# Patient Record
Sex: FEMALE | Race: WHITE | Employment: FULL TIME | URBAN - METROPOLITAN AREA
[De-identification: names, ages, dates, MRNs, and addresses within clinical notes are randomized per-mention and may not be internally consistent; named-entity substitution may affect disease eponyms.]

---

## 2021-08-03 PROBLEM — Z34.80 NORMAL PREGNANCY IN MULTIGRAVIDA: Status: ACTIVE | Noted: 2021-08-03

## 2021-09-15 PROBLEM — R82.71 GBS BACTERIURIA: Status: ACTIVE | Noted: 2021-09-15

## 2021-11-11 PROBLEM — Z23 NEEDS FLU SHOT: Status: ACTIVE | Noted: 2021-11-11

## 2022-01-26 PROBLEM — Z23 COVID-19 VACCINE ADMINISTERED: Status: ACTIVE | Noted: 2022-01-26

## 2022-01-26 PROBLEM — Z23 NEED FOR DIPHTHERIA-TETANUS-PERTUSSIS (TDAP) VACCINE: Status: ACTIVE | Noted: 2022-01-26

## 2022-02-21 PROBLEM — Z3A.40 40 WEEKS GESTATION OF PREGNANCY: Status: ACTIVE | Noted: 2022-02-21

## 2022-02-22 PROBLEM — Z34.80 NORMAL PREGNANCY IN MULTIGRAVIDA: Status: RESOLVED | Noted: 2021-08-03 | Resolved: 2022-02-22

## 2022-02-22 PROBLEM — Z23 NEED FOR DIPHTHERIA-TETANUS-PERTUSSIS (TDAP) VACCINE: Status: RESOLVED | Noted: 2022-01-26 | Resolved: 2022-02-22

## 2022-02-22 PROBLEM — R82.71 GBS BACTERIURIA: Status: RESOLVED | Noted: 2021-09-15 | Resolved: 2022-02-22

## 2022-02-22 PROBLEM — Z23 NEEDS FLU SHOT: Status: RESOLVED | Noted: 2021-11-11 | Resolved: 2022-02-22

## 2023-04-17 PROBLEM — Z34.90 NORMAL PREGNANCY, ANTEPARTUM: Status: ACTIVE | Noted: 2023-04-17

## 2023-04-19 PROBLEM — E55.9 VITAMIN D DEFICIENCY: Status: ACTIVE | Noted: 2023-04-19

## 2023-07-10 ENCOUNTER — ROUTINE PRENATAL (OUTPATIENT)
Dept: OBGYN CLINIC | Age: 28
End: 2023-07-10

## 2023-07-10 VITALS — SYSTOLIC BLOOD PRESSURE: 114 MMHG | DIASTOLIC BLOOD PRESSURE: 70 MMHG | BODY MASS INDEX: 28.46 KG/M2 | WEIGHT: 171 LBS

## 2023-07-10 DIAGNOSIS — Z34.90 NORMAL PREGNANCY, ANTEPARTUM: ICD-10-CM

## 2023-07-10 DIAGNOSIS — Z3A.24 24 WEEKS GESTATION OF PREGNANCY: Primary | ICD-10-CM

## 2023-07-10 PROCEDURE — 0502F SUBSEQUENT PRENATAL CARE: CPT | Performed by: ADVANCED PRACTICE MIDWIFE

## 2023-07-10 NOTE — PROGRESS NOTES
Pt is here today at her 23w6 prenatal visit  Pt states fetal movement is present  Pt has no concerns

## 2023-07-10 NOTE — PROGRESS NOTES
SUBJECTIVE:  Lucien Virk is here for her return OB visit. She reports fetal movement. She denies vaginal bleeding. She denies vaginal discharge. She denies leaking of fluid. She denies uterine contraction activity. She denies nausea and/or vomiting. She denies retaining fluid in her extremities. Questions about ultrasound at [facility]. OBJECTIVE:  Blood pressure 114/70, weight 171 lb (77.6 kg), last menstrual period 01/24/2023, currently breastfeeding. Lucien Virk has not the flu vaccine as appropriate  Lucien Virk has not the Tdap vaccine as appropriate        ASSESSMENT/PLAN:  1. 24 weeks gestation of pregnancy      2. Normal pregnancy, antepartum        The problem list was reviewed and updated with any new issues from today's visit    Lucien Virk will monitor fetal movement daily. 28 week lab panel was discussed and was not ordered. RhoGam was discussed and was not ordered. Lucien Virk was counseled regarding all of the above    The patient, Leandra Toscano,  was seen with a total time spent of 20 minutes for the visit on this date of service by the [facility]  The time component, involved both face-to-face (counseling and education)  and non face-to-face time (care coordination), spent in determining the total time component.

## 2023-08-10 ENCOUNTER — HOSPITAL ENCOUNTER (OUTPATIENT)
Age: 28
Setting detail: SPECIMEN
Discharge: HOME | End: 2023-08-10

## 2023-08-10 ENCOUNTER — ROUTINE PRENATAL (OUTPATIENT)
Dept: OBGYN CLINIC | Age: 28
End: 2023-08-10
Payer: COMMERCIAL

## 2023-08-10 VITALS
SYSTOLIC BLOOD PRESSURE: 118 MMHG | BODY MASS INDEX: 29.45 KG/M2 | WEIGHT: 177 LBS | DIASTOLIC BLOOD PRESSURE: 80 MMHG | HEART RATE: 94 BPM

## 2023-08-10 DIAGNOSIS — Z3A.28 28 WEEKS GESTATION OF PREGNANCY: ICD-10-CM

## 2023-08-10 DIAGNOSIS — Z23 NEED FOR DIPHTHERIA-TETANUS-PERTUSSIS (TDAP) VACCINE: ICD-10-CM

## 2023-08-10 DIAGNOSIS — Z34.90 NORMAL PREGNANCY, ANTEPARTUM: ICD-10-CM

## 2023-08-10 DIAGNOSIS — Z34.90 NORMAL PREGNANCY, ANTEPARTUM: Primary | ICD-10-CM

## 2023-08-10 LAB
BASOPHILS # BLD: <0.03 K/UL (ref 0–0.2)
BASOPHILS NFR BLD: 0 % (ref 0–2)
EOSINOPHIL # BLD: 0.07 K/UL (ref 0–0.44)
EOSINOPHILS RELATIVE PERCENT: 1 % (ref 1–4)
ERYTHROCYTE [DISTWIDTH] IN BLOOD BY AUTOMATED COUNT: 13.2 % (ref 11.8–14.4)
HCT VFR BLD AUTO: 35.1 % (ref 36.3–47.1)
HGB BLD-MCNC: 10.6 G/DL (ref 11.9–15.1)
IMM GRANULOCYTES # BLD AUTO: 0.1 K/UL (ref 0–0.3)
IMM GRANULOCYTES NFR BLD: 1 %
LYMPHOCYTES NFR BLD: 1.5 K/UL (ref 1.1–3.7)
LYMPHOCYTES RELATIVE PERCENT: 16 % (ref 24–43)
MCH RBC QN AUTO: 27.2 PG (ref 25.2–33.5)
MCHC RBC AUTO-ENTMCNC: 30.2 G/DL (ref 28.4–34.8)
MCV RBC AUTO: 90 FL (ref 82.6–102.9)
MONOCYTES NFR BLD: 0.82 K/UL (ref 0.1–1.2)
MONOCYTES NFR BLD: 9 % (ref 3–12)
NEUTROPHILS NFR BLD: 73 % (ref 36–65)
NEUTS SEG NFR BLD: 6.95 K/UL (ref 1.5–8.1)
NRBC BLD-RTO: 0 PER 100 WBC
PLATELET # BLD AUTO: 244 K/UL (ref 138–453)
PMV BLD AUTO: 10.4 FL (ref 8.1–13.5)
RBC # BLD AUTO: 3.9 M/UL (ref 3.95–5.11)
WBC OTHER # BLD: 9.5 K/UL (ref 3.5–11.3)

## 2023-08-10 PROCEDURE — 90715 TDAP VACCINE 7 YRS/> IM: CPT | Performed by: ADVANCED PRACTICE MIDWIFE

## 2023-08-10 PROCEDURE — 90471 IMMUNIZATION ADMIN: CPT | Performed by: ADVANCED PRACTICE MIDWIFE

## 2023-08-10 PROCEDURE — 0502F SUBSEQUENT PRENATAL CARE: CPT | Performed by: ADVANCED PRACTICE MIDWIFE

## 2023-08-10 NOTE — PROGRESS NOTES
SUBJECTIVE:  Kodak Shelton is here for her return OB visit. She reports fetal movement. She denies vaginal bleeding. She denies vaginal discharge. She denies leaking of fluid. She denies uterine contraction activity. She denies nausea and/or vomiting. She denies retaining fluid in her extremities. Kodak Shelton reports she is doing well. Glucola and Tdap today. Has questions about MFM scan, does not want to go back citing it takes a long time, reviewed recommendation. OBJECTIVE:  Blood pressure 118/80, pulse 94, weight 177 lb (80.3 kg), last menstrual period 2023, currently breastfeeding. Pregravid BMI: 26.63   TW lb (7.711 kg)    Kodak Shelton has not the Tdap vaccine as appropriate, received today     Postpartum Depression: Low Risk     Last EPDS Total Score: 0    Last EPDS Self Harm Result: Never        ASSESSMENT/PLAN:  IUP @ 28w2d  Kodak Shelton will monitor fetal movement daily. 28 week lab panel was discussed and was ordered. Kodak Shelton accepted repeat HIV and T. Pallidum testing. Glucola testing was initiated during this visit. RhoGam was not discussed. N/A  Did not give 24-28 week packet, provide NOV  S = D  Normal pregnancy, antepartum  -     Glucose tolerance, 1 hour; Future  -     HIV Screen; Future- verbal consent   -     T. pallidum Ab; Future  -     CBC with Auto Differential; Future  -     Tetanus-Diphth-Acell Pertussis (BOOSTRIX) injection 0.5 mL  -     Vitamin D 25 Hydroxy; Future   Need for diphtheria-tetanus-pertussis (Tdap) vaccine  Tetanus-Diphth-Acell Pertussis (BOOSTRIX) injection 0.5 mL  Vaccine Information Statement TDAP edition 2/24/15 given to patient on 8/10/2023         Return in about 2 weeks (around 2023) for OB visit with Midwives.      The patient, Angeli Phan, was seen with a total time spent of 28 minutes for the visit on this date of service by the [facility]  The time component involved both face-to-face (counseling and education) and non face-to-face time (care coordination), spent

## 2023-08-10 NOTE — PROGRESS NOTES
Pt is here today at her 28w2 prenatal visit  Pt states fetal movement is present  Pt has no concerns, pt would like tdap    Pt given 50g Glucola  Draw time 9:25a    After obtaining consent, and per orders of  Lyssa Orr CNM injection of Tdap given in Right deltoid by Brian Mccray MA.  Patient instructed to remain in clinic for 20 minutes afterwards, and to report any adverse reaction to me immediately    [address]  EXP 12/6/25

## 2023-08-11 LAB
25(OH)D3 SERPL-MCNC: 43 NG/ML
GLUCOSE 1H P 50 G GLC PO SERPL-MCNC: 108 MG/DL (ref 70–135)
GLUCOSE ADMINISTRATION: NORMAL
HIV 1+2 AB+HIV1 P24 AG SERPL QL IA: NONREACTIVE
T PALLIDUM AB SER QL IA: NONREACTIVE

## 2023-08-30 ENCOUNTER — ROUTINE PRENATAL (OUTPATIENT)
Dept: OBGYN CLINIC | Age: 28
End: 2023-08-30

## 2023-08-30 VITALS — SYSTOLIC BLOOD PRESSURE: 118 MMHG | BODY MASS INDEX: 30.12 KG/M2 | WEIGHT: 181 LBS | DIASTOLIC BLOOD PRESSURE: 74 MMHG

## 2023-08-30 DIAGNOSIS — E55.9 VITAMIN D DEFICIENCY: ICD-10-CM

## 2023-08-30 DIAGNOSIS — Z3A.31 31 WEEKS GESTATION OF PREGNANCY: Primary | ICD-10-CM

## 2023-08-30 PROCEDURE — 0502F SUBSEQUENT PRENATAL CARE: CPT

## 2023-08-30 NOTE — PROGRESS NOTES
SUBJECTIVE:  Isabella Shahid is here for her return OB visit. She reports fetal movement. She denies vaginal bleeding. She denies vaginal discharge. She denies leaking of fluid. She denies uterine contraction activity. She denies nausea and/or vomiting. She denies retaining fluid in her extremities. She denies headache, vision changes, RUQ pain, and epigastric pain. Isabella Shahid reports feeling well overall. No questions or complaints at this time. OBJECTIVE:  Blood pressure 118/74, weight 181 lb (82.1 kg), last menstrual period 2023, currently breastfeeding. Pregravid BMI: 26.63   TW lb (9.526 kg)    Isabella Shahid has received the Tdap vaccine as appropriate  Rhogam was not indicated    ASSESSMENT/PLAN:  IUP @ 31w1d  Isabella Shahid will monitor fetal movement daily. [x] 28 week lab results were reviewed. Glucola 108, Hgb 10.6. [x] Fetal Kick Count was discussed and explained. She was instructed to lay on her left side 20 minutes after eating and count movements for up to 2 hours with a target value of 10 movements. Instructed to notify office if she does not make the target. [x] Warne-Zavala contractions vs  labor contractions were reviewed. [x] Signs and symptoms of Pre-Eclampsia were reviewed and discussed. [] Initial discussion regarding birth plans was begun.  [] Given 36 week birth packet for review. S = D  Isabella Shahid was seen today for routine prenatal visit.     Diagnoses and all orders for this visit:      Patient Active Problem List    Diagnosis Date Noted    RECEIVED Tdap  08/10/2023    Vitamin D deficiency 2023- 27.8, 2,000IU supplement sent    Recheck @ 28 weeks: 43.0        Normal pregnancy, antepartum 2023     FOB- [facility]    NIPT- low risk  Carrier screen- declined  AFP- declined  hgb electrophoresis- normal    Chart reviewed 23  Dr. Ken Krishna    *repeat [facility] per mfm*       22 M Apg  Wt 7#9 2022            Return in about 4 weeks (around 2023)

## 2023-08-30 NOTE — PROGRESS NOTES
Pt is here today at her 31w1 prenatal visit  Pt states fetal movement is  present  Pt has  No concerns

## 2023-09-21 ENCOUNTER — ROUTINE PRENATAL (OUTPATIENT)
Dept: PERINATAL CARE | Age: 28
End: 2023-09-21
Payer: COMMERCIAL

## 2023-09-21 VITALS
HEIGHT: 65 IN | TEMPERATURE: 97 F | HEART RATE: 93 BPM | RESPIRATION RATE: 16 BRPM | BODY MASS INDEX: 30.71 KG/M2 | SYSTOLIC BLOOD PRESSURE: 124 MMHG | DIASTOLIC BLOOD PRESSURE: 79 MMHG | WEIGHT: 184.3 LBS

## 2023-09-21 DIAGNOSIS — O09.899 SHORT INTERVAL BETWEEN PREGNANCIES COMPLICATING PREGNANCY, ANTEPARTUM: ICD-10-CM

## 2023-09-21 DIAGNOSIS — Z36.4 ULTRASOUND FOR ANTENATAL SCREENING FOR FETAL GROWTH RESTRICTION: ICD-10-CM

## 2023-09-21 DIAGNOSIS — O43.103 PLACENTAL ABNORMALITY IN THIRD TRIMESTER: Primary | ICD-10-CM

## 2023-09-21 DIAGNOSIS — Z3A.34 34 WEEKS GESTATION OF PREGNANCY: ICD-10-CM

## 2023-09-21 DIAGNOSIS — Z13.89 ENCOUNTER FOR ROUTINE SCREENING FOR MALFORMATION USING ULTRASONICS: ICD-10-CM

## 2023-09-21 LAB
ABDOMINAL CIRCUMFERENCE: NORMAL
BIPARIETAL DIAMETER: NORMAL
ESTIMATED FETAL WEIGHT: NORMAL
FEMORAL DIAMETER: NORMAL
HC/AC: NORMAL
HEAD CIRCUMFERENCE: NORMAL

## 2023-09-21 PROCEDURE — 76819 FETAL BIOPHYS PROFIL W/O NST: CPT | Performed by: OBSTETRICS & GYNECOLOGY

## 2023-09-21 PROCEDURE — 76805 OB US >/= 14 WKS SNGL FETUS: CPT | Performed by: OBSTETRICS & GYNECOLOGY

## 2023-09-26 ENCOUNTER — TELEPHONE (OUTPATIENT)
Dept: OBGYN CLINIC | Age: 28
End: 2023-09-26

## 2023-09-26 ENCOUNTER — ROUTINE PRENATAL (OUTPATIENT)
Dept: OBGYN CLINIC | Age: 28
End: 2023-09-26

## 2023-09-26 VITALS
SYSTOLIC BLOOD PRESSURE: 114 MMHG | WEIGHT: 184 LBS | BODY MASS INDEX: 30.62 KG/M2 | DIASTOLIC BLOOD PRESSURE: 70 MMHG | HEART RATE: 102 BPM

## 2023-09-26 DIAGNOSIS — Z34.90 NORMAL PREGNANCY, ANTEPARTUM: Primary | ICD-10-CM

## 2023-09-26 DIAGNOSIS — Z3A.35 35 WEEKS GESTATION OF PREGNANCY: ICD-10-CM

## 2023-09-26 PROCEDURE — 0502F SUBSEQUENT PRENATAL CARE: CPT

## 2023-09-26 NOTE — PROGRESS NOTES
SUBJECTIVE:  Chyna Salinas is here for her routine OB visit. She reports fetal movement. She denies vaginal bleeding. She denies leaking of fluid. She denies vaginal discharge. She denies uterine contraction activity. She denies nausea and/or vomiting. She denies retaining fluid in her extremities  She denies headache, visual changes, epigastric discomfort  Chyna Salinas reports she is feeling well. Has concerns about baby still being breech. OBJECTIVE:   Blood pressure 114/70, pulse (!) 102, weight 184 lb (83.5 kg), last menstrual period 2023, currently breastfeeding. Pregravid BMI: 26.63   TW lb (10.9 kg)    Chyna Salinas has received the Tdap vaccine as appropriate  Chyna Salinas has not received the influenza vaccine as appropriate  Rhogam was not indicated    ASSESSMENT/PLAN:  IUP @ 35w0d  Chyna Salinas will monitor for fetal movements daily  []  GBS culture was not obtained. NOV  [x]  28 week lab results were reviewed. Glucola 108, Hgb 10.6. [x]  Signs of labor to report were  and when to call midwives was discussed  []  Birth preferences were not discussed. []  Has been given 36 week birth packet for review. [x]  Signs and symptoms of Pre-Eclampsia were reviewed and discussed. []  Post-dates testing and protocol were not discussed  []  Chyna Salinas was not counseled regarding Post Partum Depression and help  [x]  She has decided on contraceptive choice. - Ring  [x]  Chart has been reviewed by collaborating physician. S = D  Chyna Salinas was seen today for routine prenatal visit. Diagnoses and all orders for this visit:    Normal pregnancy, antepartum    35 weeks gestation of pregnancy    Breech presentation, single or unspecified fetus  Visit with collaborators being set up to discuss ECV vs. Primary C/S      Return in about 1 week (around 10/3/2023) for Prenatal visit.      Chyna Salinas was counseled regarding all of the above    The patient, Rc Fitzgerald, was seen with a total time spent of 35 minutes for the visit on this date of

## 2023-09-26 NOTE — PROGRESS NOTES
Pt is here today at her 35w prenatal visit  Pt states fetal movement is present  Pt has concerns about baby position  Pt declines flu shot

## 2023-09-26 NOTE — TELEPHONE ENCOUNTER
Midwife office called , patient is 35 weeks and breech. She needs a consult next opening showed October 16 patient needs something sooner. I need permission to squeeze her in with one of you please. She will be 38/39 weeks by 10/16 midwifes stated that's to far. Would give patient a call back once we can figure something out.

## 2023-09-27 NOTE — TELEPHONE ENCOUNTER
Can you schedule patient with me at 10:15 am 9/28/23?     Thanks,    DO Margi Tapia Ob/Gyn   9/27/2023, 12:31 PM

## 2023-09-28 ENCOUNTER — INITIAL CONSULT (OUTPATIENT)
Dept: OBGYN CLINIC | Age: 28
End: 2023-09-28

## 2023-09-28 VITALS
HEART RATE: 96 BPM | SYSTOLIC BLOOD PRESSURE: 126 MMHG | BODY MASS INDEX: 30.65 KG/M2 | WEIGHT: 184.2 LBS | DIASTOLIC BLOOD PRESSURE: 77 MMHG

## 2023-09-28 DIAGNOSIS — Z3A.35 35 WEEKS GESTATION OF PREGNANCY: ICD-10-CM

## 2023-09-28 DIAGNOSIS — O09.93 SUPERVISION OF HIGH RISK PREGNANCY IN THIRD TRIMESTER: ICD-10-CM

## 2023-09-28 NOTE — PROGRESS NOTES
Prenatal Visit    Josseline Gan is a 34 y.o. female [id number] at 35w2d    The patient was seen and evaluated. Reports positive fetal movements. She denies headache, vision changes, RUQ pain, contractions, vaginal bleeding and leakage of fluid. The patient already received the T-Dap Vaccine (27-36 weeks) this pregnancy. The patient declined the influenza vaccine this year. The problem list reflects the active issues addressed during today's visit    Vitals:    BP: 126/77  Weight - Scale: 184 lb 3.2 oz (83.6 kg)  Pulse: 96  Fundal Height (cm): 35 cm  Fetal HR: 142  Movement: Present     28 Week Labs: The patient is RH +, Rhogam not indicated  ABO/Rh   Date Value Ref Range Status   04/17/2023 B POSITIVE  Final       1hr GTT: 108   28 week CBC:   Lab Results   Component Value Date    WBC 9.5 08/10/2023    HGB 10.6 (L) 08/10/2023    HCT 35.1 (L) 08/10/2023    MCV 90.0 08/10/2023     08/10/2023       Assessment & Plan:  Josseline Gan is a 34 y.o. female [id number] at 28w1d   - Discussed signs or symptoms of when to call office   - Discussed fetal movement parameters  - 28 week labs completed   - discussed recommendations for TDAP immunization, patient already received TDAP. - Next MMW appt 10/6/23   - Continue taking Prenatal Vitamin.   - The ACIP recommended pregnant patients be included in phase 1C of vaccine distribution. This decision is supported by [facility] and ACOG. As of February 16, 2021, there have been over 30,000 pregnant patients included in the V-safe post COVID vaccination safety . Most (73%) reports to VAERS among pregnant women involved non-pregnancyspecific adverse events (e.g., local and systemic reactions). Miscarriage was the most frequently reported pregnancy-specific adverse event to VAERS; numbers are within the known background rates based on presumed COVID-19 vaccine doses administered to pregnant women.  No unexpected pregnancy or infant outcomes have been observed

## 2023-10-06 ENCOUNTER — ROUTINE PRENATAL (OUTPATIENT)
Dept: OBGYN CLINIC | Age: 28
End: 2023-10-06

## 2023-10-06 ENCOUNTER — HOSPITAL ENCOUNTER (OUTPATIENT)
Age: 28
Setting detail: SPECIMEN
Discharge: HOME | End: 2023-10-06

## 2023-10-06 VITALS
HEART RATE: 100 BPM | WEIGHT: 184 LBS | SYSTOLIC BLOOD PRESSURE: 122 MMHG | DIASTOLIC BLOOD PRESSURE: 78 MMHG | BODY MASS INDEX: 30.62 KG/M2

## 2023-10-06 DIAGNOSIS — Z3A.36 36 WEEKS GESTATION OF PREGNANCY: ICD-10-CM

## 2023-10-06 DIAGNOSIS — O09.93 SUPERVISION OF HIGH RISK PREGNANCY IN THIRD TRIMESTER: ICD-10-CM

## 2023-10-06 DIAGNOSIS — Z23 NEEDS FLU SHOT: ICD-10-CM

## 2023-10-06 DIAGNOSIS — O09.93 SUPERVISION OF HIGH RISK PREGNANCY IN THIRD TRIMESTER: Primary | ICD-10-CM

## 2023-10-06 NOTE — PROGRESS NOTES
Pt is here today at her 36w3 prenatal visit  Pt states fetal movement is present  Pt has concerns about baby position

## 2023-10-06 NOTE — PROGRESS NOTES
SUBJECTIVE:  Raj Malone is here for her routine OB visit. She reports fetal movement. She denies vaginal bleeding. She denies leaking of fluid. She denies vaginal discharge. She denies uterine contraction activity. She denies nausea and/or vomiting. She denies retaining fluid in her extremities  She denies headache, visual changes, epigastric discomfort  Raj Malone reports she thinks baby is now head down. Using [employer] for breast pump      OBJECTIVE:   Blood pressure 122/78, pulse 100, weight 184 lb (83.5 kg), last menstrual period 2023, currently breastfeeding. Pregravid BMI: 26.63   TW lb (10.9 kg)    SVE:  deferred    Raj Malone has received the Tdap vaccine as appropriate  Raj Malone has not received the influenza vaccine as appropriate, declines   Rhogam was not indicated    BSUS shows cephalic presentation    ASSESSMENT/PLAN:  IUP @ 36w3d  Raj Malone will monitor for fetal movements daily  [x]  GBS culture was obtained. [x]  28 week lab results were reviewed. Glucola 108, Hgb 10.6. [x]  Signs of labor to report were  and when to call midwives was discussed. [x]  Birth preferences were discussed. []  Has been given 36 week birth packet for review. [x]  Signs and symptoms of Pre-Eclampsia were reviewed and discussed. []  Post-dates testing and protocol were not discussed  []  Raj Malone was not counseled regarding Post Partum Depression and help  [x]  She has decided on contraceptive choice. Ring. [x]  Chart has been reviewed by collaborating physician. S = D  Supervision of high risk pregnancy in third trimester  Culture, Strep B Screen, Vaginal/Rectal; Future  RESOLVED - Breech presentation, single or unspecified fetus  Cephalic today, monitor at visits. Will notify Dr. Zachary Garcia and other midwives of cancelled ECV on 10/11/23  Needs flu shot  DECLINES        []  Induction management was discussed and induction scheduled N/A    Return in about 1 week (around 10/13/2023) for OB visit with Midwives.

## 2023-10-08 LAB
MICROORGANISM SPEC CULT: ABNORMAL
SPECIMEN DESCRIPTION: ABNORMAL

## 2023-10-09 PROBLEM — B95.1 POSITIVE GBS TEST: Status: ACTIVE | Noted: 2023-10-09

## 2023-10-11 NOTE — PROGRESS NOTES
SUBJECTIVE:    Brittany Mcqueen is here for her routine OB visit. She is doing well but ready for labor! She is reporting new onset back pain along with BH contractions. She has noticed increased mucus plug with blood-tinge. She is open to sVE  She reports  fetal movement. She denies  vaginal bleeding. She denies  leaking of fluid. She denies  vaginal discharge. She denies  uterine contraction activity. She denies  nausea and/or vomiting. She denies retaining fluid in her extremities  She denies headache, visual changes, epigastric discomfort      OBJECTIVE:   Blood pressure 114/74, pulse (!) 102, weight 185 lb (83.9 kg), last menstrual period 01/24/2023, currently breastfeeding. 4/13/2022     4:14 PM   Postpartum Depression Scale   I have been able to laugh and see the funny side of things As much as I always could   I have looked forward with enjoyment to things As much as I ever did   I have blamed myself unnecessarily when things went wrong No, never   I have been anxious or worried for no good reason No, not at all   I have felt scared or panicky for no good reason No, not at all   I haven't been able to cope lately No, I have been coping as well as ever   I have been so unhappy that I have had difficulty sleeping Not at all   I have felt sad or miserable No, not at all   I have been so unhappy that I have been crying No, never   The thought of harming myself has occurred to me Never   Total 0            10/13/2021     2:50 PM   PHQ Scores   PHQ2 Score 0   PHQ9 Score 0           4/17/2023    11:00 AM   MYRON 7 SCORE   MYRON-7 Total Score 0     SVE: Posterior/softening/1 cm/40%/-2 and Asynclitic      ASSESSMENT/PLAN:  1. 37 weeks gestation of pregnancy  S=D    2. Normal pregnancy, antepartum  The problem list was reviewed and updated as needed.     Brittany Mcqueen will monitor for fetal movements daily    []  3rd trimester labs were not obtained   [x]  Signs of labor to report were discussed  [x]  When to call/page for

## 2023-10-12 ENCOUNTER — ROUTINE PRENATAL (OUTPATIENT)
Dept: OBGYN CLINIC | Age: 28
End: 2023-10-12

## 2023-10-12 VITALS
WEIGHT: 185 LBS | DIASTOLIC BLOOD PRESSURE: 74 MMHG | BODY MASS INDEX: 30.79 KG/M2 | HEART RATE: 102 BPM | SYSTOLIC BLOOD PRESSURE: 114 MMHG

## 2023-10-12 DIAGNOSIS — Z3A.37 37 WEEKS GESTATION OF PREGNANCY: ICD-10-CM

## 2023-10-12 DIAGNOSIS — Z34.90 NORMAL PREGNANCY, ANTEPARTUM: Primary | ICD-10-CM

## 2023-10-12 DIAGNOSIS — B95.1 POSITIVE GBS TEST: ICD-10-CM

## 2023-10-12 NOTE — PROGRESS NOTES
Pt is here today at her 37w2 prenatal visit  Pt states fetal movement is present  Pt has no concerns states back pain and cramping with possible mucous plug discharge.

## 2023-10-19 ENCOUNTER — ROUTINE PRENATAL (OUTPATIENT)
Dept: OBGYN CLINIC | Age: 28
End: 2023-10-19

## 2023-10-19 VITALS
BODY MASS INDEX: 30.62 KG/M2 | WEIGHT: 184 LBS | SYSTOLIC BLOOD PRESSURE: 118 MMHG | DIASTOLIC BLOOD PRESSURE: 72 MMHG | HEART RATE: 103 BPM

## 2023-10-19 DIAGNOSIS — Z3A.38 38 WEEKS GESTATION OF PREGNANCY: ICD-10-CM

## 2023-10-19 DIAGNOSIS — Z34.90 NORMAL PREGNANCY, ANTEPARTUM: Primary | ICD-10-CM

## 2023-10-19 PROCEDURE — 0502F SUBSEQUENT PRENATAL CARE: CPT | Performed by: ADVANCED PRACTICE MIDWIFE

## 2023-10-19 NOTE — PROGRESS NOTES
Pt is here today at her 38w2 prenatal visit  Pt states fetal movement is present  Pt has no concerns
management was discussed and induction scheduled N/A    Return in about 1 week (around 10/26/2023) for OB visit with Midwives. Cruz Camacho was counseled regarding all of the above    The patient, Dejuan Buckner, was seen with a total time spent of 28 minutes for the visit on this date of service by the [facility]  The time component involved both face-to-face (counseling and education) and non face-to-face time (care coordination), spent in determining the total time component.      On this date October 19, 2023, I have spent greater than 50% of this visit:  [x] Reviewing previous notes/pre-charting  [x] Reviewing test results  [x] Performing necessary physical exam/evaluation  [] Ordering medications, tests, procedures  [] Placing referrals or communicating with other HCP  [x] Documenting and updating Problem List as necessary  [x] Importance of compliance with treatment plan discussed  [x] Counseling patient/support person  [] Coordinating care    Electronically signed by: LEENA Saucedo CNM

## 2023-10-25 ENCOUNTER — ROUTINE PRENATAL (OUTPATIENT)
Dept: OBGYN CLINIC | Age: 28
End: 2023-10-25

## 2023-10-25 VITALS
SYSTOLIC BLOOD PRESSURE: 118 MMHG | HEART RATE: 80 BPM | BODY MASS INDEX: 30.79 KG/M2 | DIASTOLIC BLOOD PRESSURE: 80 MMHG | WEIGHT: 185 LBS

## 2023-10-25 DIAGNOSIS — Z34.90 NORMAL PREGNANCY, ANTEPARTUM: ICD-10-CM

## 2023-10-25 DIAGNOSIS — Z3A.39 39 WEEKS GESTATION OF PREGNANCY: Primary | ICD-10-CM

## 2023-10-25 PROCEDURE — 0502F SUBSEQUENT PRENATAL CARE: CPT

## 2023-10-25 NOTE — PROGRESS NOTES
SUBJECTIVE:  Amber Hunter is here for her routine OB visit. She reports fetal movement. She denies vaginal bleeding. She denies leaking of fluid. She denies vaginal discharge. She denies uterine contraction activity. She denies nausea and/or vomiting. She denies retaining fluid in her extremities  She denies headache, visual changes, epigastric discomfort  Amber Hunter reports she is feeling well, no concerns today. OBJECTIVE:   Blood pressure 118/80, pulse 80, weight 83.9 kg (185 lb), last menstrual period 2023, currently breastfeeding. Pregravid BMI: 26.63   TW.3 kg (25 lb)    SVE:  3/60%/-2, soft consistency, posterior position, cephalic   0 1 2 3 Patient    Dilation Closed 1-2 3-4 5-6 2    Effacement 0-30 40-50 60-70 >80 2    Station -3 -2 -1/0 +1/+2 1    Consistency Firm Med Soft  2    Position Post Mid Ant  0   TOTAL        7       Amber Hunter has received the Tdap vaccine as appropriate  Amber Hunter has not received the influenza vaccine as appropriate  Rhogam was not indicated    ASSESSMENT/PLAN:  IUP @ 39w1d  Amber Hunter will monitor for fetal movements daily  [x]  GBS POSITIVE - PCN in labor  [x]  28 week lab results were reviewed. Glucola 108, Hgb 10.6. [x]  Signs of labor to report were  and when to call midwives was discussed  [x]  Birth preferences were discussed. [x]  Signs and symptoms of Pre-Eclampsia were reviewed and discussed. []  Post-dates testing and protocol were not discussed  []  Amber Hunter was not counseled regarding Post Partum Depression and help  []  She has not decided on contraceptive choice. []  Chart has been reviewed by collaborating physician. S = D  Amber Hunter was seen today for routine prenatal visit. Diagnoses and all orders for this visit:    Normal pregnancy, antepartum    39 weeks gestation of pregnancy    Return in about 1 week (around 2023) for Prenatal visit.      Amber Hunter was counseled regarding all of the above    The patient, Juve Rojo, was seen with a total time

## 2023-11-01 ENCOUNTER — HOSPITAL ENCOUNTER (INPATIENT)
Age: 28
LOS: 2 days | Discharge: HOME | End: 2023-11-03
Attending: STUDENT IN AN ORGANIZED HEALTH CARE EDUCATION/TRAINING PROGRAM | Admitting: STUDENT IN AN ORGANIZED HEALTH CARE EDUCATION/TRAINING PROGRAM
Payer: COMMERCIAL

## 2023-11-01 PROBLEM — O47.9 UTERINE CONTRACTIONS DURING PREGNANCY: Status: ACTIVE | Noted: 2023-11-01

## 2023-11-01 LAB
ABO + RH BLD: NORMAL
AMPHET UR QL SCN: NEGATIVE
ARM BAND NUMBER: NORMAL
BARBITURATES UR QL SCN: NEGATIVE
BENZODIAZ UR QL: NEGATIVE
BLOOD BANK SAMPLE EXPIRATION: NORMAL
BLOOD GROUP ANTIBODIES SERPL: NEGATIVE
CANNABINOIDS UR QL SCN: NEGATIVE
COCAINE UR QL SCN: NEGATIVE
ERYTHROCYTE [DISTWIDTH] IN BLOOD BY AUTOMATED COUNT: 14.6 % (ref 11.8–14.4)
FENTANYL UR QL: NEGATIVE
HCT VFR BLD AUTO: 32.7 % (ref 36.3–47.1)
HGB BLD-MCNC: 10.3 G/DL (ref 11.9–15.1)
MCH RBC QN AUTO: 23.6 PG (ref 25.2–33.5)
MCHC RBC AUTO-ENTMCNC: 31.5 G/DL (ref 28.4–34.8)
MCV RBC AUTO: 75 FL (ref 82.6–102.9)
METHADONE UR QL: NEGATIVE
NRBC BLD-RTO: 0 PER 100 WBC
OPIATES UR QL SCN: NEGATIVE
OXYCODONE UR QL SCN: NEGATIVE
PCP UR QL SCN: NEGATIVE
PLATELET # BLD AUTO: 249 K/UL (ref 138–453)
PMV BLD AUTO: 10.9 FL (ref 8.1–13.5)
RBC # BLD AUTO: 4.36 M/UL (ref 3.95–5.11)
TEST INFORMATION: NORMAL
WBC OTHER # BLD: 9.8 K/UL (ref 3.5–11.3)

## 2023-11-01 PROCEDURE — 86780 TREPONEMA PALLIDUM: CPT

## 2023-11-01 PROCEDURE — 2580000003 HC RX 258: Performed by: ADVANCED PRACTICE MIDWIFE

## 2023-11-01 PROCEDURE — 86901 BLOOD TYPING SEROLOGIC RH(D): CPT

## 2023-11-01 PROCEDURE — 85027 COMPLETE CBC AUTOMATED: CPT

## 2023-11-01 PROCEDURE — 1220000000 HC SEMI PRIVATE OB R&B

## 2023-11-01 PROCEDURE — 6360000002 HC RX W HCPCS: Performed by: ADVANCED PRACTICE MIDWIFE

## 2023-11-01 PROCEDURE — 80307 DRUG TEST PRSMV CHEM ANLYZR: CPT

## 2023-11-01 PROCEDURE — 86900 BLOOD TYPING SEROLOGIC ABO: CPT

## 2023-11-01 PROCEDURE — 86850 RBC ANTIBODY SCREEN: CPT

## 2023-11-01 RX ORDER — SODIUM CHLORIDE 0.9 % (FLUSH) 0.9 %
5-40 SYRINGE (ML) INJECTION PRN
Status: DISCONTINUED | OUTPATIENT
Start: 2023-11-01 | End: 2023-11-02

## 2023-11-01 RX ORDER — SODIUM CHLORIDE, SODIUM LACTATE, POTASSIUM CHLORIDE, AND CALCIUM CHLORIDE .6; .31; .03; .02 G/100ML; G/100ML; G/100ML; G/100ML
1000 INJECTION, SOLUTION INTRAVENOUS PRN
Status: DISCONTINUED | OUTPATIENT
Start: 2023-11-01 | End: 2023-11-02

## 2023-11-01 RX ORDER — SODIUM CHLORIDE 0.9 % (FLUSH) 0.9 %
5-40 SYRINGE (ML) INJECTION EVERY 12 HOURS SCHEDULED
Status: DISCONTINUED | OUTPATIENT
Start: 2023-11-01 | End: 2023-11-02

## 2023-11-01 RX ORDER — SODIUM CHLORIDE 9 MG/ML
INJECTION, SOLUTION INTRAVENOUS PRN
Status: DISCONTINUED | OUTPATIENT
Start: 2023-11-01 | End: 2023-11-02

## 2023-11-01 RX ORDER — SODIUM CHLORIDE, SODIUM LACTATE, POTASSIUM CHLORIDE, CALCIUM CHLORIDE 600; 310; 30; 20 MG/100ML; MG/100ML; MG/100ML; MG/100ML
INJECTION, SOLUTION INTRAVENOUS CONTINUOUS
Status: DISCONTINUED | OUTPATIENT
Start: 2023-11-01 | End: 2023-11-02

## 2023-11-01 RX ORDER — SODIUM CHLORIDE, SODIUM LACTATE, POTASSIUM CHLORIDE, AND CALCIUM CHLORIDE .6; .31; .03; .02 G/100ML; G/100ML; G/100ML; G/100ML
500 INJECTION, SOLUTION INTRAVENOUS PRN
Status: DISCONTINUED | OUTPATIENT
Start: 2023-11-01 | End: 2023-11-02

## 2023-11-01 RX ADMIN — SODIUM CHLORIDE, POTASSIUM CHLORIDE, SODIUM LACTATE AND CALCIUM CHLORIDE: 600; 310; 30; 20 INJECTION, SOLUTION INTRAVENOUS at 22:59

## 2023-11-01 RX ADMIN — SODIUM CHLORIDE 5 MILLION UNITS: 900 INJECTION INTRAVENOUS at 23:01

## 2023-11-02 PROBLEM — Z23 NEEDS FLU SHOT: Status: RESOLVED | Noted: 2023-10-06 | Resolved: 2023-11-02

## 2023-11-02 PROBLEM — O47.9 UTERINE CONTRACTIONS DURING PREGNANCY: Status: RESOLVED | Noted: 2023-11-01 | Resolved: 2023-11-02

## 2023-11-02 PROBLEM — Z34.90 NORMAL PREGNANCY, ANTEPARTUM: Status: RESOLVED | Noted: 2023-04-17 | Resolved: 2023-11-02

## 2023-11-02 PROBLEM — Z23 NEED FOR DIPHTHERIA-TETANUS-PERTUSSIS (TDAP) VACCINE: Status: RESOLVED | Noted: 2023-08-10 | Resolved: 2023-11-02

## 2023-11-02 LAB — T PALLIDUM AB SER QL IA: NONREACTIVE

## 2023-11-02 PROCEDURE — 10907ZC DRAINAGE OF AMNIOTIC FLUID, THERAPEUTIC FROM PRODUCTS OF CONCEPTION, VIA NATURAL OR ARTIFICIAL OPENING: ICD-10-PCS | Performed by: ADVANCED PRACTICE MIDWIFE

## 2023-11-02 PROCEDURE — 1220000000 HC SEMI PRIVATE OB R&B

## 2023-11-02 PROCEDURE — 6370000000 HC RX 637 (ALT 250 FOR IP): Performed by: STUDENT IN AN ORGANIZED HEALTH CARE EDUCATION/TRAINING PROGRAM

## 2023-11-02 PROCEDURE — 7200000001 HC VAGINAL DELIVERY

## 2023-11-02 PROCEDURE — 59400 OBSTETRICAL CARE: CPT | Performed by: ADVANCED PRACTICE MIDWIFE

## 2023-11-02 PROCEDURE — 88307 TISSUE EXAM BY PATHOLOGIST: CPT

## 2023-11-02 PROCEDURE — 6360000002 HC RX W HCPCS: Performed by: ADVANCED PRACTICE MIDWIFE

## 2023-11-02 RX ORDER — SODIUM CHLORIDE 0.9 % (FLUSH) 0.9 %
5-40 SYRINGE (ML) INJECTION PRN
Status: DISCONTINUED | OUTPATIENT
Start: 2023-11-02 | End: 2023-11-03 | Stop reason: HOSPADM

## 2023-11-02 RX ORDER — ONDANSETRON 2 MG/ML
4 INJECTION INTRAMUSCULAR; INTRAVENOUS EVERY 6 HOURS PRN
Status: DISCONTINUED | OUTPATIENT
Start: 2023-11-02 | End: 2023-11-02

## 2023-11-02 RX ORDER — CARBOPROST TROMETHAMINE 250 UG/ML
250 INJECTION, SOLUTION INTRAMUSCULAR PRN
Status: DISCONTINUED | OUTPATIENT
Start: 2023-11-02 | End: 2023-11-02

## 2023-11-02 RX ORDER — LANOLIN 72 %
OINTMENT (GRAM) TOPICAL PRN
Status: DISCONTINUED | OUTPATIENT
Start: 2023-11-02 | End: 2023-11-03 | Stop reason: HOSPADM

## 2023-11-02 RX ORDER — IBUPROFEN 600 MG/1
600 TABLET ORAL EVERY 6 HOURS PRN
Qty: 40 TABLET | Refills: 1 | Status: SHIPPED | OUTPATIENT
Start: 2023-11-02

## 2023-11-02 RX ORDER — SODIUM CHLORIDE 0.9 % (FLUSH) 0.9 %
5-40 SYRINGE (ML) INJECTION EVERY 12 HOURS SCHEDULED
Status: DISCONTINUED | OUTPATIENT
Start: 2023-11-02 | End: 2023-11-03 | Stop reason: HOSPADM

## 2023-11-02 RX ORDER — SIMETHICONE 80 MG
80 TABLET,CHEWABLE ORAL EVERY 6 HOURS PRN
Status: DISCONTINUED | OUTPATIENT
Start: 2023-11-02 | End: 2023-11-03 | Stop reason: HOSPADM

## 2023-11-02 RX ORDER — SODIUM CHLORIDE 9 MG/ML
INJECTION, SOLUTION INTRAVENOUS PRN
Status: DISCONTINUED | OUTPATIENT
Start: 2023-11-02 | End: 2023-11-03 | Stop reason: HOSPADM

## 2023-11-02 RX ORDER — TRANEXAMIC ACID 10 MG/ML
1000 INJECTION, SOLUTION INTRAVENOUS
Status: DISCONTINUED | OUTPATIENT
Start: 2023-11-02 | End: 2023-11-02

## 2023-11-02 RX ORDER — SENNA AND DOCUSATE SODIUM 50; 8.6 MG/1; MG/1
1 TABLET, FILM COATED ORAL 2 TIMES DAILY
Qty: 60 TABLET | Refills: 0 | Status: SHIPPED | OUTPATIENT
Start: 2023-11-02

## 2023-11-02 RX ORDER — DOCUSATE SODIUM 100 MG/1
100 CAPSULE, LIQUID FILLED ORAL 2 TIMES DAILY
Status: DISCONTINUED | OUTPATIENT
Start: 2023-11-02 | End: 2023-11-03 | Stop reason: HOSPADM

## 2023-11-02 RX ORDER — IBUPROFEN 600 MG/1
600 TABLET ORAL EVERY 6 HOURS PRN
Status: DISCONTINUED | OUTPATIENT
Start: 2023-11-02 | End: 2023-11-03 | Stop reason: HOSPADM

## 2023-11-02 RX ORDER — BISACODYL 10 MG
10 SUPPOSITORY, RECTAL RECTAL DAILY PRN
Status: DISCONTINUED | OUTPATIENT
Start: 2023-11-02 | End: 2023-11-03 | Stop reason: HOSPADM

## 2023-11-02 RX ORDER — MISOPROSTOL 100 UG/1
800 TABLET ORAL PRN
Status: DISCONTINUED | OUTPATIENT
Start: 2023-11-02 | End: 2023-11-02

## 2023-11-02 RX ORDER — ONDANSETRON 2 MG/ML
4 INJECTION INTRAMUSCULAR; INTRAVENOUS EVERY 4 HOURS PRN
Status: DISCONTINUED | OUTPATIENT
Start: 2023-11-02 | End: 2023-11-03 | Stop reason: HOSPADM

## 2023-11-02 RX ORDER — ACETAMINOPHEN 500 MG
1000 TABLET ORAL EVERY 6 HOURS PRN
Status: DISCONTINUED | OUTPATIENT
Start: 2023-11-02 | End: 2023-11-03 | Stop reason: HOSPADM

## 2023-11-02 RX ORDER — ACETAMINOPHEN 500 MG
1000 TABLET ORAL EVERY 6 HOURS PRN
Qty: 30 TABLET | Refills: 1 | Status: SHIPPED | OUTPATIENT
Start: 2023-11-02

## 2023-11-02 RX ORDER — METHYLERGONOVINE MALEATE 0.2 MG/ML
200 INJECTION INTRAVENOUS PRN
Status: DISCONTINUED | OUTPATIENT
Start: 2023-11-02 | End: 2023-11-02

## 2023-11-02 RX ADMIN — DOCUSATE SODIUM 100 MG: 100 CAPSULE, LIQUID FILLED ORAL at 19:52

## 2023-11-02 RX ADMIN — Medication 166.7 ML: at 07:48

## 2023-11-02 RX ADMIN — Medication 2.5 MILLION UNITS: at 03:12

## 2023-11-02 RX ADMIN — ACETAMINOPHEN 1000 MG: 500 TABLET ORAL at 09:34

## 2023-11-02 RX ADMIN — Medication 2.5 MILLION UNITS: at 07:07

## 2023-11-02 NOTE — PROGRESS NOTES
OB/GYN MMW Resident Involvement Note     Date: 2023       Time: 10:42 PM   Patient Name: Serena Edwards     Patient : 1995  Room/Bed: 6145/5003-75    Admission Date/Time: 2023 10:21 PM      HPI: Serena Edwards is a 34 y.o. [id number] at 40w1d who presents in spontaneous labor. The patient reports fetal movement is present, complains of contractions, denies loss of fluid, denies vaginal bleeding. Patient denies RUQ pain, nausea, vomitting, vision changes, HA. Patients pregnancy is complicated by short interval pregnancy. DATING:  LMP: Patient's last menstrual period was 2023 (exact date).   Estimated Date of Delivery: 10/31/23   Based on: LMP, consistent with US at 11 6/7 weeks GA    PREGNANCY RISK FACTORS:  Patient Active Problem List   Diagnosis    Normal pregnancy, antepartum    Vitamin D deficiency    RECEIVED Tdap     RESOLVED - Breech presentation    DECLINES flu vaccine    Positive GBS test    Uterine contractions during pregnancy        Steroids Given In This Pregnancy:  no     REVIEW OF SYSTEMS:   Constitutional: negative fever, negative chills, negative weight changes   HEENT: negative visual disturbances, negative headaches, negative dizziness, negative hearing loss  Breast: Negative breast abnormalities, negative breast lumps, negative nipple discharge  Respiratory: negative dyspnea, negative cough, negative SOB  Cardiovascular: negative chest pain,  negative palpitations, negative arrhythmia, negative syncope   Gastrointestinal: negative abdominal pain, negative RUQ pain, negative N/V, negative diarrhea, negative constipation, negative bowel changes, negative heartburn   Genitourinary: negative dysuria, negative hematuria, negative urinary incontinence, negative vaginal discharge, negative vaginal bleeding or spotting  Dermatological: negative rash, negative pruritis, negative mole or other skin changes  Hematologic: negative bruising  Immunologic/Lymphatic: negative

## 2023-11-02 NOTE — CARE COORDINATION
ANTEPARTUM NOTE    Uterine contractions during pregnancy [O47.9]    Thais Mora was admitted to L&D on 11/01/2023 for contractions and labor @ 40 1/7    OB GYN Provider: [facility]    Will meet with patient after delivery to verify name/address/phone/insurance and discuss discharge planning. Anticipate DC home 2 nights after vaginal delivery or 4 nights after C/S delivery as long as hemodynamically stable.

## 2023-11-02 NOTE — L&D DELIVERY NOTE
Galina Part [[id number]]      Labor Events     Labor: No   Steroids: None  Cervical Ripening Date/Time:      Antibiotics Received during Labor: Yes  Rupture Identifier: Sac 1  Rupture Date/Time:  23 03:52:08   Rupture Type: AROM  Fluid Color: Clear  Fluid Odor: None  Fluid Volume:  Moderate  Induction: None  Augmentation: None              Anesthesia    Method: None       Labor Event Times      Labor onset date/time:  23 03:46:00     Dilation complete date/time:        Start pushing date/time:  2023 56:25:03   Decision date/time (emergent ):            Delivery Details      Delivery Date: 23 Delivery Time: 07:35:00   Delivery Type: Vaginal, Spontaneous              Shoulder Dystocia    Shoulder Dystocia Present?: No                                                                                                           Assisted Delivery Details    Forceps Attempted?: No  Vacuum Extractor Attempted?: No                                                             Cord    Vessels: 3 Vessels  Complications: None  Delayed Cord Clamping?: Yes  Cord Clamped Date/Time: 2023 07:44:00  Cord Blood Disposition: Lab  Gases Sent?: Yes              Placenta    Date/Time: 2023 07:48:00  Removal: Spontaneous  Appearance: Intact  Disposition: Discarded       Lacerations    Episiotomy: None  Perineal Lacerations: None  Other Lacerations: no non-perineal laceration       Vaginal Counts    Initial Count Personnel: Sandhya Morrissey       Blood Loss  Mother: Acosta Bianchi #[id number]     Start of Mother's Information      Delivery Blood Loss  23 0346 - 23 0800      None                 End of Mother's Information  Mother: Acosta Bianchi #[id number]                Delivery Providers    Delivering clinician: LEENA Mao CNM     Provider Role     Obstetrician    Cade Dent RN Primary Nurse     Primary Union Nurse     NICU Nurse     Neonatologist

## 2023-11-02 NOTE — FLOWSHEET NOTE
Patient admitted to room 742  from L&D via wheelchair. Oriented to room and surroundings. Plan of care reviewed. Verbalized understanding. Instructed on infant security and safe sleep practices. Preventing falls education provided . The following handouts given: A New Beginning: Your Guide to Postpartum Care, Rounding, gs Security System,Babies Cry A lot, Safe Sleep, Security and Visitation Guidelines. Call light placed within reach.

## 2023-11-02 NOTE — CARE COORDINATION
CASE MANAGEMENT POST-PARTUM TRANSITIONAL CARE PLAN    Uterine contractions during pregnancy [O47.9]    OB Provider: [facility] met w/ Ana Fu at her bedside to discuss DCP. She is S/P  on 2023    Writer verified phone number correct on facesheet. S[place]     [facility] [address]    [place] [place] [address]    Information faxed to [phone] for correction. She states she lives with her  Cheyenne Jones and their two children. Ana Fu verbalized no difficulties with transportation to and from doctors appointments or with paying for medications upon discharge home. [employer] insurance correct. There two listed but they appear to be the same policy. Also sent to [phone] for correction. Writer notified Ana Fu she has  30 days from date of birth to add  to insurance policy. She verbalized understanding. Ana Fu confirmed a safe place for infant to sleep at home. Infant name on BC: Katia Guallpa. Infant PCP Dr George Monsivais. DME: no  HOME CARE: no    Anticipated DC of mother and infant 1-2 days post .      Readmission Risk              Risk of Unplanned Readmission:  4

## 2023-11-02 NOTE — FLOWSHEET NOTE
Pt arrived with complaints of contractions q 3 minutes. Pt denies any leakage of fluid, vaginal bleeding or discharge.  Postive fetal movement

## 2023-11-02 NOTE — PROGRESS NOTES
[address] [facility],[address] LABOR & DELIVERY  [address] [address]  Dept: [phone]  Loc: [phone]  LABOR PROGRESS NOTE      Patient Name: Washington Hwang  Patient : 1995  Room/Bed: 8937/3894-35  Admission Date/Time: 2023 10:21 PM  MRN #: [id number]  [facility] #: [id number]    Date: 2023  Time: 3:55 AM    The patient was seen and examined. Her pain is well controlled. She reports her contractions \"feel about the same\". She is able to talk through them. She reports fetal movement is present, complains of contractions, denies loss of fluid, denies vaginal bleeding. Denies s/s of preeclampsia including headache vision changes, difficulty breathing, chest pain, RUQ pain or worsening swelling of extremities. Vital Signs:  VS:  blood pressure is 138/83 and her pulse is 85. Her respiration is 16 and oxygen saturation is 98%. FHT:    Baseline: 115   Variability: moderate              Accels: present   Decels: Absent         Contraction pattern:                                  Frequency: q3 min                                 Duration:                                  Intensity: moderate                                 Pitocin: 0                                 IUPC:  No  Chaperone: SHIRA Hi RN  Cervix:             Dilation: 6            Effacement: 80            Station: 0            Position: posterior            Consistency: soft    Status of membranes: intact    Pain control: coping well    Maternal status (hydration, fatigue, voids): WNL    Interventions: Pt was offered AROM to augment labor. Risk/ benefits discussed and pt agreeable.      Assessment/Plan:  Washington Hwang is a 34 y.o. female [id number] at 40w2d admitted for inpatient   - cEFM/ TOCO  - GBS positive, treated x 2 doses   - VSS, Afebrile    - IV SL   - expectant management   - anticipate    - ROM x 0 hrs   - Diet: regular   - Category 1 strip      Attending: Dr. Thu Rouse, APRN

## 2023-11-02 NOTE — FLOWSHEET NOTE
Pt off the monitor for intermittant monitoring orders Per Estrella Cárdenas CNM. Pt up to ambulate hallway or use physioball. positive fetal movement

## 2023-11-02 NOTE — H&P
OBSTETRICAL HISTORY AND PHYSICAL      [facility] [address] [facility] LABOR & DELIVERY  [place] [address]  Dept: [phone]  Loc: [phone]  Provider:  LEENA Melchor CNM      Date: 2023  Time: 10:50 PM    Patient Name: Josseline Gan  Patient : 1995  Room/Bed: [facility]  Admission Date/Time: 2023 10:21 PM  MRN #: [id number]  [facility] #: [id number]    Primary Care Physician: No primary care provider on file. Admitting Attending: LEENA Melchor CNM      Josseline Gan is a 34 y.o. female [id number]    CC: Onset of Labor     HPI: Josseline Gan is a 34 y.o. [id number] at 40w1d who presents with contractions since . The patient reports fetal movement is present, complains of contractions, denies loss of fluid, denies vaginal bleeding. This patient is agreeable to resident participation in her care. DATING:  LMP: Patient's last menstrual period was 2023 (exact date). Estimated Date of Delivery: 10/31/23   Based on: LMP    PREGNANCY RISK FACTORS:  Patient Active Problem List   Diagnosis    Normal pregnancy, antepartum    Vitamin D deficiency    RECEIVED Tdap     RESOLVED - Breech presentation    DECLINES flu vaccine    Positive GBS test    Uterine contractions during pregnancy         Allergies: Allergies as of 2023    (No Known Allergies)       Medications:  No current facility-administered medications on file prior to encounter. Current Outpatient Medications on File Prior to Encounter   Medication Sig Dispense Refill    Vitamin D, Cholecalciferol, 50 MCG ( UT) CAPS Take 1 capsule by mouth daily 30 capsule 5    Prenatal Vit-Fe Fumarate-FA (PRENATAL VITAMIN) 27-0.8 MG TABS Take 1 each by mouth daily            Steroids Given In This Pregnancy:  no     No past medical history on file.     Past Surgical History:   Procedure Laterality Date    WISDOM TOOTH EXTRACTION         OB History    Para Term  AB Living   5 2 2 0 2 2   SAB IAB Ectopic Molar Multiple Live Births   1 1 0 0 0 2      # Outcome Date GA Lbr David/2nd Weight Sex Delivery Anes PTL Lv   5 Current            4 Term 02/21/22 40w0d / 00:17 3.43 kg (7 lb 9 oz) M Vag-Spont Nitrous Oxid N MISSAEL      Name: Toi Margarito: 9  Apgar5: 9   3 Term 06/22/20 40w2d 02:32 / 00:45 2.955 kg (6 lb 8.2 oz) F Vag-Spont EPI N MISSAEL      Birth Comments: Induction      Name: Avel Lonsdale: 8  Apgar5: 9   2 IAB            1 SAB               Obstetric Comments   FOB same - G1 & G2       Family History   Problem Relation Age of Onset    Asthma Mother     No Known Problems Father     No Known Problems Paternal Grandfather     Diabetes Paternal Grandmother     Lung Cancer Maternal Grandfather     Other Maternal Grandfather         sepsis    No Known Problems Brother     No Known Problems Sister          Social History     Socioeconomic History    Marital status:      Spouse name: Not on file    Number of children: Not on file    Years of education: Not on file    Highest education level: Not on file   Occupational History    Not on file   Tobacco Use    Smoking status: Never     Passive exposure: Yes    Smokeless tobacco: Never   Vaping Use    Vaping Use: Never used   Substance and Sexual Activity    Alcohol use: Not Currently    Drug use: Never    Sexual activity: Yes     Partners: Male   Other Topics Concern    Not on file   Social History Narrative    Not on file     Social Determinants of Health     Financial Resource Strain: Low Risk  (4/17/2023)    Overall Financial Resource Strain (CARDIA)     Difficulty of Paying Living Expenses: Not hard at all   Food Insecurity: No Food Insecurity (4/17/2023)    Hunger Vital Sign     Worried About Running Out of Food in the Last Year: Never true     Ran Out of Food in the Last Year: Never true   Transportation Needs: No Transportation Needs (4/17/2023)    PRAPARE - Transportation     Lack of

## 2023-11-03 VITALS
RESPIRATION RATE: 18 BRPM | TEMPERATURE: 97.8 F | SYSTOLIC BLOOD PRESSURE: 99 MMHG | DIASTOLIC BLOOD PRESSURE: 68 MMHG | HEART RATE: 60 BPM | OXYGEN SATURATION: 100 %

## 2023-11-03 PROCEDURE — 6370000000 HC RX 637 (ALT 250 FOR IP): Performed by: STUDENT IN AN ORGANIZED HEALTH CARE EDUCATION/TRAINING PROGRAM

## 2023-11-03 PROCEDURE — 99024 POSTOP FOLLOW-UP VISIT: CPT

## 2023-11-03 RX ADMIN — DOCUSATE SODIUM 100 MG: 100 CAPSULE, LIQUID FILLED ORAL at 09:31

## 2023-11-03 RX ADMIN — IBUPROFEN 600 MG: 600 TABLET, FILM COATED ORAL at 09:31

## 2023-11-03 ASSESSMENT — PAIN DESCRIPTION - LOCATION: LOCATION: ABDOMEN

## 2023-11-03 ASSESSMENT — PAIN - FUNCTIONAL ASSESSMENT: PAIN_FUNCTIONAL_ASSESSMENT: ACTIVITIES ARE NOT PREVENTED

## 2023-11-03 ASSESSMENT — PAIN DESCRIPTION - DESCRIPTORS: DESCRIPTORS: DISCOMFORT;CRAMPING

## 2023-11-03 ASSESSMENT — PAIN SCALES - GENERAL: PAINLEVEL_OUTOF10: 2

## 2023-11-03 ASSESSMENT — PAIN DESCRIPTION - ORIENTATION: ORIENTATION: LOWER

## 2023-11-03 NOTE — LACTATION NOTE
INPATIENT CONSULT    Maternal /para status:     Maternal breastfeeding history:   Breast fed both of her other children for 6 months each. Current pregnancy:    Gestational age: 36 2/7 weeks     C/section or vaginal delivery:       Birth weight: 3615  7# 15oz      Plan for feeding: breast      Breast pump at home: yes        Assessment of breastfeeding:  Mom reports baby has been sleepy,but feeding well at breast.  He was circumcised this morning, reviewed post circ behaviors and benefits of STS after the procedure.          Reviewed:   - Breastfeeding packet  - Expectations for normal  feeding   - Hand expression  - Deep latch/milk transfer  - Cues for feeding (early/late)     Encouraged:   - Frequent skin to skin with mom or dad  - Frequent attempts to feed  - Calling for assistance as needed     Discharge instructions and LC follow up reviewed

## 2023-11-03 NOTE — FLOWSHEET NOTE
I have reviewed all AWHON[facility] Post-Birth Warning Signs and essential teaching points for pulmonary embolism, cardiac disease, hypertensive disorders of pregnancy, obstetric hemorrhage, venous thromboembolism, infection, and postpartum depression with the patient and  FOB(support person) . I have informed the patient on when to call their healthcare provider and when to call 911. I have discussed with the patient  the importance of scheduling a follow-up visit with their physician, nurse practitioner or midwife and provided them with correct contact information for appointment. I have provided the patient with a copy of the \"Save Your Life\" handout. The patient has acknowledged receiving and understanding this education with her signature.

## 2023-11-03 NOTE — DISCHARGE INSTRUCTIONS
Follow-up with your OB doctor in 2 weeks or as specified by your physician. Please refer to A NEW BEGINNING- YOUR PERSONAL GUIDE TO POSTPARTUM CARE book provided in your room. Please take this book home with you to refer to. For Breastfeeding moms, you can contact our lactation specialist,  with any problems or questions you may have. Phone number [phone]. Feel free to leave voice mail and your call will be returned as soon as possible. DIET  Eat a well balanced diet focusing on foods high in fiber and protein. Drink 8-10 glasses of fluids daily, especially water. To avoid constipation you may take a mild stool softener as recommended by your doctor or midwife. ACTIVITY  Gradually increase your activity. Resume exercise regimen only after advise by your doctor or midwife. Avoid lifting anything heavier than your baby or a gallon of milk for SIX weeks. Avoid driving 1 week for vaginal delivery and 2 weeks for  section, unless otherwise instructed by physician. Rise slowly from a lying to sitting and then a standing position. Climb stairs carefully. Use caution when carrying your baby up and down the stairs. NO SEXUAL Activity for 6 weeks or until advised by your doctor; Nothing in vagina: intercourse, tampons, or douching. Be prepared to discuss family planning at your follow-up OB visit. You may feel tired or have a lack of energy. You may continue your prenatal vitamin to replenish nutrients post delivery. Nap when baby naps to catch up on sleep. May shower, NO tub baths, swimming, or hot tubs. EMOTIONS  You may feel reyes, sad, teary, & overwhelmed for the first 2 weeks postpartum. Contact your OB provider if you feel you may be showing signs of postpartum depression, or have thoughts of harming yourself or your infant. If infant will not stop crying, contact another adult for help or place infant in their crib on their back and take a break.   NEVER shake your

## 2023-11-03 NOTE — FLOWSHEET NOTE
Baby's band sheet verified  with MOM and DAD at bedside prior to discharge and paper mixed into all discharge papers and sent home with MOM accidentally .

## 2023-11-03 NOTE — FLOWSHEET NOTE
Patient discharged home with baby and FOB, baby secured in car seat per parents , all belongings sent . Sunscreens for the Face  La Roche-Posay Mineral Sheer     CeraVe Sheer Mineral    *Elta MD UV Clear    Start broad spectrum sunscreen with SPF 30 and zinc oxide and/or titanium dioxide.  Use sunscreen daily.

## 2023-11-03 NOTE — PLAN OF CARE
Problem: Postpartum  Goal: Experiences normal postpartum course  Description:  Postpartum OB-Pregnancy care plan goal which identifies if the mother is experiencing a normal postpartum course  Outcome: Completed

## 2023-11-03 NOTE — PROGRESS NOTES
CLINICAL PHARMACY NOTE: MEDS TO BEDS    Total # of Prescriptions Filled: 3   The following medications were delivered to the patient:  TYLENOL 500MG   SENEXON   IBU 600MG     Additional Documentation:

## 2023-11-03 NOTE — DISCHARGE SUMMARY
Obstetric Discharge Summary  [address]    Patient Name: Trish Wolff  Patient : 1995  Primary Care Physician: No primary care provider on file.   Admit Date: 2023    Principal Diagnosis: IUP at 40w2d, admitted for Onset of Labor       Her pregnancy has been complicated by:   Patient Active Problem List   Diagnosis    Vitamin D deficiency    RESOLVED - Breech presentation    Positive GBS test     23 M Apg 8/9 Wt 7#15       Infection Present?: No  Hospital Acquired: No    Surgical Operations & Procedures:  [] Pitocin Induction of Labor  [] Pitocin Augmentation of Labor  [] Prostaglandin Induction of Labor  [] Cytotec (Misoprostol)  [] Mechanical Induction of Labor  [x] Artificial Rupture of Membranes  [] Intrauterine Pressure Catheter  [] Fetal Scalp Electrode  [] Amnioinfusion    Analgesia: none  Delivery Type: Spontaneous Vaginal Delivery   Laceration(s): Absent    Consultations: none    Pertinent Findings & Procedures:   Trish Wolff is a 34 y.o. female E5O2285 at 45w3d admitted for onset of labor; received artifical rupture of membranes, and delivered by spontaneous vaginal a Live Born      Information for the patient's :  Pedro Pressley [[id number]]   male   Birth Weight: 3.615 kg (7 lb 15.5 oz)     Apgars:   Information for the patient's :  Pedro Pressley [[id number]]        Postpartum course: normal.      Course of patient: uncomplicated    Discharge to: Home    Readmission planned: no     Recommendations on Discharge:     Medications:        Medication List        START taking these medications      acetaminophen 500 MG tablet  Commonly known as: TYLENOL  Take 2 tablets by mouth every 6 hours as needed for Pain     ibuprofen 600 MG tablet  Commonly known as: ADVIL;MOTRIN  Take 1 tablet by mouth every 6 hours as needed for Pain     sennosides-docusate sodium 8.6-50 MG tablet  Commonly known as: SENOKOT-S  Take 1 tablet by mouth in the morning and

## 2023-11-06 ENCOUNTER — TELEPHONE (OUTPATIENT)
Dept: OBGYN CLINIC | Age: 28
End: 2023-11-06

## 2023-11-06 LAB — SURGICAL PATHOLOGY REPORT: NORMAL

## 2023-11-06 NOTE — TELEPHONE ENCOUNTER
Writer left message for patient to call the office to schedule a 2 week pp visit with Radha Carlisle.

## 2023-11-17 ENCOUNTER — POSTPARTUM VISIT (OUTPATIENT)
Dept: OBGYN CLINIC | Age: 28
End: 2023-11-17

## 2023-11-17 VITALS
BODY MASS INDEX: 26.82 KG/M2 | SYSTOLIC BLOOD PRESSURE: 102 MMHG | WEIGHT: 161 LBS | HEIGHT: 65 IN | DIASTOLIC BLOOD PRESSURE: 66 MMHG

## 2023-11-17 PROCEDURE — 0503F POSTPARTUM CARE VISIT: CPT

## 2023-11-17 NOTE — PROGRESS NOTES
HPI:  DELIVERY DATE: 11/02/2023  TYPE OF DELIVERY: normal spontaneous vaginal delivery  PROVIDER: Cristopher Cooks   PERINEUM: None    Frank Abbott was seen for her 2 week visit. Her Male infant is healthy. She is breast feeding. She is breastfeeding without difficulty. She is not experiencing pain. She is rating her pain 0  She reports her lochia is thin lochia  She reports no perineal discomfort. She denies urinary incontinence. Her bowels have returned to her normal pattern. She is back to her normal activity pattern. Frank Abbott has not engaged in intercourse. She does not report a mood disorder. She feels she is not having difficulty coping. Frank Abbott feels her family adjustment is effective. She reports an overall positive birth experience. OBJECTIVE:   Wt Readings from Last 3 Encounters:   11/17/23 73 kg (161 lb)   10/25/23 83.9 kg (185 lb)   10/19/23 83.5 kg (184 lb)       Blood pressure 102/66, height 1.651 m (5' 5\"), weight 73 kg (161 lb), last menstrual period 01/24/2023, currently breastfeeding. EPDS: 0       Social Determinants of Health:   Financial Resource Strain: Low Risk  (4/17/2023)    Overall Financial Resource Strain (CARDIA)     Difficulty of Paying Living Expenses: Not hard at all      Food Insecurity: No Food Insecurity (4/17/2023)    Hunger Vital Sign     Worried About Running Out of Food in the Last Year: Never true     Ran Out of Food in the Last Year: Never true      Transportation Needs: No Transportation Needs (4/17/2023)    PRAPARE - Transportation     Lack of Transportation (Medical): No     Lack of Transportation (Non-Medical): No      Intimate Partner Violence: Not At Risk (4/17/2023)    Humiliation, Afraid, Rape, and Kick questionnaire     Fear of Current or Ex-Partner: No     Emotionally Abused: No     Physically Abused: No     Sexually Abused: No       ASSESSMENT/PLAN:    2 week postpartum visit  Labs were not ordered. Return to the office in 4 weeks.   She will return for 6

## 2023-12-14 ENCOUNTER — POSTPARTUM VISIT (OUTPATIENT)
Dept: OBGYN CLINIC | Age: 28
End: 2023-12-14

## 2023-12-14 VITALS
SYSTOLIC BLOOD PRESSURE: 110 MMHG | DIASTOLIC BLOOD PRESSURE: 70 MMHG | BODY MASS INDEX: 26.59 KG/M2 | HEIGHT: 65 IN | HEART RATE: 81 BPM | WEIGHT: 159.6 LBS

## 2023-12-14 PROCEDURE — 0503F POSTPARTUM CARE VISIT: CPT

## 2023-12-14 ASSESSMENT — PATIENT HEALTH QUESTIONNAIRE - PHQ9
SUM OF ALL RESPONSES TO PHQ QUESTIONS 1-9: 0
1. LITTLE INTEREST OR PLEASURE IN DOING THINGS: 0
SUM OF ALL RESPONSES TO PHQ QUESTIONS 1-9: 0
SUM OF ALL RESPONSES TO PHQ QUESTIONS 1-9: 0
2. FEELING DOWN, DEPRESSED OR HOPELESS: 0
SUM OF ALL RESPONSES TO PHQ QUESTIONS 1-9: 0
SUM OF ALL RESPONSES TO PHQ9 QUESTIONS 1 & 2: 0

## 2023-12-14 NOTE — PROGRESS NOTES
Patient is here for her 6 week postpartum visit. Pt had a vaginal delivery on 11/2/23. Pt has no concerns for today visit.

## 2023-12-14 NOTE — PROGRESS NOTES
Chief Complaint   Patient presents with    Postpartum Care     Pt is here for 6 wk postpartum visit , vaginal delivery 11/2/23     Needs note for work for no restrictions    SUBJECTIVE:    HPI:  DELIVERY DATE: 11/2/2023  TYPE OF DELIVERY: normal spontaneous vaginal delivery  PROVIDER: Bonifacio Ledezma CNM  PERINEUM: Intact    Jet Carroll was seen for her 6 week postpartum visit. Her male infant is healthy. She is breast feeding without difficulty. She is not experiencing pain. She reports her lochia is no bleeding  She reports none perineal discomfort. She denies urinary incontinence. Her bowels have returned to her normal pattern. She is back to her normal activity pattern. She has not her first menses. Jet Carroll has engaged in intercourse. [place] was protected. She does not report a mood disorder. She feels she is having difficulty coping. Jet Carroll feels her family adjustment is effective. She reports adequate support system. She reports an overall positive birth experience. Her Semora Score is 0. This score does match my assessment. She denies headache, vision changes, RUQ pain, epigastric pain, and swelling. OBJECTIVE:   Wt Readings from Last 3 Encounters:   12/14/23 72.4 kg (159 lb 9.6 oz)   11/17/23 73 kg (161 lb)   10/25/23 83.9 kg (185 lb)       Vitals:    12/14/23 0934   BP: 110/70   Site: Left Upper Arm   Position: Sitting   Cuff Size: Medium Adult   Pulse: 81   Weight: 72.4 kg (159 lb 9.6 oz)   Height: 1.651 m (5' 5\")        Breast exam: exam deferred    Abdomen: Soft non-tender without guarding. There is diastasis present. The diastasis is 1 fingerbreadth of separation. Perineum: not inspected    Extremities: No calf tenderness bilaterally. No edema.     EPDS: 0       Social Determinants of Health:   Financial Resource Strain: Low Risk  (4/17/2023)    Overall Financial Resource Strain (CARDIA)     Difficulty of Paying Living Expenses: Not hard at all      Food Insecurity: Not on file